# Patient Record
Sex: MALE | Race: BLACK OR AFRICAN AMERICAN
[De-identification: names, ages, dates, MRNs, and addresses within clinical notes are randomized per-mention and may not be internally consistent; named-entity substitution may affect disease eponyms.]

---

## 2018-11-24 ENCOUNTER — HOSPITAL ENCOUNTER (EMERGENCY)
Dept: HOSPITAL 53 - M ED | Age: 18
LOS: 1 days | Discharge: HOME | End: 2018-11-25
Payer: COMMERCIAL

## 2018-11-24 DIAGNOSIS — K29.20: Primary | ICD-10-CM

## 2018-11-25 LAB
ALBUMIN/GLOBULIN RATIO: 1.17 (ref 1–1.93)
ALBUMIN: 4.2 GM/DL (ref 3.2–5.2)
ALKALINE PHOSPHATASE: 92 U/L (ref 45–117)
ALT SERPL W P-5'-P-CCNC: 49 U/L (ref 12–78)
ANION GAP: 6 MEQ/L (ref 8–16)
APPEARANCE, URINE: CLEAR
AST SERPL-CCNC: 57 U/L (ref 7–37)
BACTERIA UR QL AUTO: NEGATIVE
BASO #: 0.1 10^3/UL (ref 0–0.2)
BASO %: 0.8 % (ref 0–1)
BILIRUBIN, URINE AUTO: NEGATIVE
BILIRUBIN,TOTAL: 0.9 MG/DL (ref 0.2–1)
BLOOD UREA NITROGEN: 15 MG/DL (ref 7–18)
BLOOD, URINE BLOOD: NEGATIVE
CALCIUM LEVEL: 8.9 MG/DL (ref 8.5–10.1)
CARBON DIOXIDE LEVEL: 31 MEQ/L (ref 21–32)
CHLORIDE LEVEL: 105 MEQ/L (ref 98–107)
CREATININE FOR GFR: 1.01 MG/DL (ref 0.7–1.3)
EOS #: 0.1 10^3/UL (ref 0–0.5)
EOSINOPHIL NFR BLD AUTO: 0.9 % (ref 0–3)
GLUCOSE, FASTING: 91 MG/DL (ref 70–100)
GLUCOSE, URINE (UA) AUTO: NEGATIVE MG/DL
HEMATOCRIT: 45.3 % (ref 42–52)
HEMOGLOBIN: 15.2 G/DL (ref 13.5–17.5)
IMMATURE GRANULOCYTE %: 0.3 % (ref 0–3)
KETONE, URINE AUTO: NEGATIVE MG/DL
LEUKOCYTE ESTERASE UR QL STRIP.AUTO: NEGATIVE
LIPASE: 141 U/L (ref 73–393)
LYMPH #: 1.7 10^3/UL (ref 1.5–6.5)
LYMPH %: 25.8 % (ref 24–44)
MEAN CORPUSCULAR HEMOGLOBIN: 29.3 PG (ref 27–33)
MEAN CORPUSCULAR HGB CONC: 33.6 G/DL (ref 32–36.5)
MEAN CORPUSCULAR VOLUME: 87.5 FL (ref 80–96)
MONO #: 0.5 10^3/UL (ref 0–0.8)
MONO %: 8 % (ref 0–5)
MUCUS, URINE: (no result)
NEUTROPHILS #: 4.2 10^3/UL (ref 1.8–7.7)
NEUTROPHILS %: 64.2 % (ref 36–66)
NITRITE, URINE AUTO: NEGATIVE
NRBC BLD AUTO-RTO: 0 % (ref 0–0)
PH,URINE: 7 UNITS (ref 5–9)
PLATELET COUNT, AUTOMATED: 162 10^3/UL (ref 150–450)
POTASSIUM SERUM: 4.1 MEQ/L (ref 3.5–5.1)
PROT UR QL STRIP.AUTO: (no result) MG/DL
RBC, URINE AUTO: 2 /HPF (ref 0–3)
RED BLOOD COUNT: 5.18 10^6/UL (ref 4.3–6.1)
RED CELL DISTRIBUTION WIDTH: 13.4 % (ref 11.5–14.5)
SODIUM LEVEL: 142 MEQ/L (ref 136–145)
SPECIFIC GRAVITY URINE AUTO: 1.03 (ref 1–1.03)
SQUAMOUS #/AREA URNS AUTO: 0 /HPF (ref 0–6)
TOTAL PROTEIN: 7.8 GM/DL (ref 6.4–8.2)
UROBILINOGEN, URINE AUTO: 0.2 MG/DL (ref 0–2)
WBC, URINE AUTO: 5 /HPF (ref 0–3)
WHITE BLOOD COUNT: 6.5 10^3/UL (ref 4–10)

## 2018-11-25 RX ADMIN — ONDANSETRON 1 MG: 4 TABLET, ORALLY DISINTEGRATING ORAL at 02:19

## 2018-11-25 RX ADMIN — ONDANSETRON 1 MG: 2 INJECTION INTRAMUSCULAR; INTRAVENOUS at 00:08

## 2018-11-25 RX ADMIN — PROMETHAZINE HYDROCHLORIDE 1 MG: 25 TABLET ORAL at 01:48

## 2018-11-25 RX ADMIN — SODIUM CHLORIDE 1 MLS/HR: 9 INJECTION, SOLUTION INTRAVENOUS at 00:08

## 2020-06-02 ENCOUNTER — HOSPITAL ENCOUNTER (OUTPATIENT)
Dept: HOSPITAL 53 - M LAB | Age: 20
End: 2020-06-02
Attending: GENERAL ACUTE CARE HOSPITAL
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
## 2020-06-02 NOTE — REP
Clinical: Autopsy

 

Technique: Portable supine view of the pelvis.

 

Findings:

Displaced transverse fracture through the right femoral shaft.  No other obvious

fracture.

 

Impression:

Transverse displaced fracture through the right femoral shaft.

 

 

Electronically Signed by

Federico Cunningham MD 06/02/2020 05:13 P